# Patient Record
Sex: MALE | URBAN - METROPOLITAN AREA
[De-identification: names, ages, dates, MRNs, and addresses within clinical notes are randomized per-mention and may not be internally consistent; named-entity substitution may affect disease eponyms.]

---

## 2018-07-25 ENCOUNTER — APPOINTMENT (RX ONLY)
Dept: URBAN - METROPOLITAN AREA CLINIC 347 | Facility: CLINIC | Age: 23
Setting detail: DERMATOLOGY
End: 2018-07-25

## 2018-07-25 DIAGNOSIS — L72.0 EPIDERMAL CYST: ICD-10-CM

## 2018-07-25 PROCEDURE — ? OTHER

## 2018-07-25 PROCEDURE — ? PRESCRIPTION

## 2018-07-25 PROCEDURE — ? TREATMENT REGIMEN

## 2018-07-25 PROCEDURE — ? COUNSELING

## 2018-07-25 PROCEDURE — 99213 OFFICE O/P EST LOW 20 MIN: CPT

## 2018-07-25 RX ORDER — DOXYCYCLINE HYCLATE 100 MG/1
CAPSULE, GELATIN COATED ORAL
Qty: 30 | Refills: 0 | Status: ERX

## 2018-07-25 ASSESSMENT — LOCATION SIMPLE DESCRIPTION DERM: LOCATION SIMPLE: NECK

## 2018-07-25 ASSESSMENT — LOCATION DETAILED DESCRIPTION DERM: LOCATION DETAILED: LEFT CENTRAL POSTERIOR NECK

## 2018-07-25 ASSESSMENT — LOCATION ZONE DERM: LOCATION ZONE: NECK

## 2018-07-25 NOTE — PROCEDURE: OTHER
Other (Free Text): Intralesional Kenalog 10mg per 1ml\\n.2cc total\\nLot: [id number]\\nExp: oct 2019 Other (Free Text): Intralesional Kenalog 10mg per 1ml\\n.2cc total\\nLot: [id number]\\nExp: oct 2019

## 2018-07-25 NOTE — PROCEDURE: TREATMENT REGIMEN
Initiate Treatment: Doxy 100mg - take one pill once daily with food for one month
Detail Level: Zone

## 2019-10-31 ENCOUNTER — APPOINTMENT (RX ONLY)
Dept: URBAN - METROPOLITAN AREA CLINIC 347 | Facility: CLINIC | Age: 24
Setting detail: DERMATOLOGY
End: 2019-10-31

## 2019-10-31 DIAGNOSIS — L72.8 OTHER FOLLICULAR CYSTS OF THE SKIN AND SUBCUTANEOUS TISSUE: ICD-10-CM

## 2019-10-31 DIAGNOSIS — D22 MELANOCYTIC NEVI: ICD-10-CM

## 2019-10-31 PROBLEM — D22.39 MELANOCYTIC NEVI OF OTHER PARTS OF FACE: Status: ACTIVE | Noted: 2019-10-31

## 2019-10-31 PROBLEM — L70.0 ACNE VULGARIS: Status: ACTIVE | Noted: 2019-10-31

## 2019-10-31 PROCEDURE — ? OTHER

## 2019-10-31 PROCEDURE — ? COUNSELING

## 2019-10-31 PROCEDURE — 99213 OFFICE O/P EST LOW 20 MIN: CPT | Mod: 25

## 2019-10-31 PROCEDURE — ? INCISION AND DRAINAGE

## 2019-10-31 PROCEDURE — 10060 I&D ABSCESS SIMPLE/SINGLE: CPT

## 2019-10-31 ASSESSMENT — LOCATION SIMPLE DESCRIPTION DERM
LOCATION SIMPLE: RIGHT CHEEK
LOCATION SIMPLE: LEFT CHEEK
LOCATION SIMPLE: LEFT TEMPLE

## 2019-10-31 ASSESSMENT — LOCATION DETAILED DESCRIPTION DERM
LOCATION DETAILED: LEFT INFERIOR TEMPLE
LOCATION DETAILED: RIGHT INFERIOR CENTRAL MALAR CHEEK
LOCATION DETAILED: LEFT INFERIOR CENTRAL MALAR CHEEK

## 2019-10-31 ASSESSMENT — LOCATION ZONE DERM: LOCATION ZONE: FACE

## 2019-10-31 NOTE — PROCEDURE: OTHER
Note Text (......Xxx Chief Complaint.): This diagnosis correlates with the
Detail Level: Detailed
Other (Free Text): F/u in 2 wks if any problems with healing.

## 2019-10-31 NOTE — PROCEDURE: MIPS QUALITY
Quality 130: Documentation Of Current Medications In The Medical Record: Current Medications Documented
Quality 226: Preventive Care And Screening: Tobacco Use: Screening And Cessation Intervention: Patient screened for tobacco use, is a smoker AND did not received Cessation Counseling for Unknown Reasons
Detail Level: Detailed

## 2019-10-31 NOTE — PROCEDURE: INCISION AND DRAINAGE
Lesion Type: Cyst
Curette Text (Optional): After the contents were expressed a curette was used to partially remove the cyst wall.
Hemostasis: Aluminum Chloride
Method: 11 blade
Render Postcare In Note?: No
Epidermal Sutures: 4-0 Ethilon
Epidermal Closure: simple interrupted
Suture Text: The incision was partially closed with
Post-Care Instructions: I reviewed with the patient in detail post-care instructions. Patient should keep wound covered and call the office should any redness, pain, swelling or worsening occur.
Dressing: no dressing
Size Of Lesion In Cm (Optional But May Be Required For Some Insurances): 0
Consent was obtained and risks were reviewed including but not limited to delayed wound healing, infection, need for multiple I and D's, and pain.
Detail Level: Detailed
Anesthesia Type: 1% lidocaine with 1:100,000 epinephrine
Anesthesia Volume In Cc: 1.5
Preparation Text: The area was prepped in the usual clean fashion.